# Patient Record
Sex: FEMALE | URBAN - METROPOLITAN AREA
[De-identification: names, ages, dates, MRNs, and addresses within clinical notes are randomized per-mention and may not be internally consistent; named-entity substitution may affect disease eponyms.]

---

## 2023-11-28 ENCOUNTER — APPOINTMENT (RX ONLY)
Dept: URBAN - METROPOLITAN AREA CLINIC 149 | Facility: CLINIC | Age: 48
Setting detail: DERMATOLOGY
End: 2023-11-28

## 2023-11-28 DIAGNOSIS — Z71.89 OTHER SPECIFIED COUNSELING: ICD-10-CM

## 2023-11-28 DIAGNOSIS — L57.8 OTHER SKIN CHANGES DUE TO CHRONIC EXPOSURE TO NONIONIZING RADIATION: ICD-10-CM

## 2023-11-28 DIAGNOSIS — D22 MELANOCYTIC NEVI: ICD-10-CM

## 2023-11-28 DIAGNOSIS — L82.1 OTHER SEBORRHEIC KERATOSIS: ICD-10-CM

## 2023-11-28 DIAGNOSIS — D18.0 HEMANGIOMA: ICD-10-CM

## 2023-11-28 DIAGNOSIS — L21.8 OTHER SEBORRHEIC DERMATITIS: ICD-10-CM

## 2023-11-28 DIAGNOSIS — Z85.828 PERSONAL HISTORY OF OTHER MALIGNANT NEOPLASM OF SKIN: ICD-10-CM

## 2023-11-28 PROBLEM — D23.5 OTHER BENIGN NEOPLASM OF SKIN OF TRUNK: Status: ACTIVE | Noted: 2023-11-28

## 2023-11-28 PROBLEM — D18.01 HEMANGIOMA OF SKIN AND SUBCUTANEOUS TISSUE: Status: ACTIVE | Noted: 2023-11-28

## 2023-11-28 PROBLEM — D22.5 MELANOCYTIC NEVI OF TRUNK: Status: ACTIVE | Noted: 2023-11-28

## 2023-11-28 PROCEDURE — ? EDUCATIONAL RESOURCES PROVIDED

## 2023-11-28 PROCEDURE — ? TREATMENT REGIMEN

## 2023-11-28 PROCEDURE — ? PRESCRIPTION

## 2023-11-28 PROCEDURE — ? PATIENT SPECIFIC COUNSELING

## 2023-11-28 PROCEDURE — ? COUNSELING

## 2023-11-28 PROCEDURE — ? OTC TREATMENT REGIMEN

## 2023-11-28 PROCEDURE — 99203 OFFICE O/P NEW LOW 30 MIN: CPT

## 2023-11-28 PROCEDURE — ? PRESCRIPTION MEDICATION MANAGEMENT

## 2023-11-28 RX ORDER — HYDROCORTISONE 25 MG/G
CREAM TOPICAL
Qty: 30 | Refills: 2 | Status: ERX | COMMUNITY
Start: 2023-11-28

## 2023-11-28 RX ORDER — KETOCONAZOLE 20 MG/G
CREAM TOPICAL
Qty: 60 | Refills: 2 | Status: ERX | COMMUNITY
Start: 2023-11-28

## 2023-11-28 RX ADMIN — KETOCONAZOLE: 20 CREAM TOPICAL at 00:00

## 2023-11-28 RX ADMIN — HYDROCORTISONE: 25 CREAM TOPICAL at 00:00

## 2023-11-28 ASSESSMENT — LOCATION DETAILED DESCRIPTION DERM
LOCATION DETAILED: RIGHT CYMBA CONCHA
LOCATION DETAILED: LEFT ANTERIOR PROXIMAL THIGH
LOCATION DETAILED: LEFT CRUS OF HELIX
LOCATION DETAILED: RIGHT SUPERIOR UPPER BACK

## 2023-11-28 ASSESSMENT — LOCATION ZONE DERM
LOCATION ZONE: EAR
LOCATION ZONE: LEG
LOCATION ZONE: TRUNK

## 2023-11-28 ASSESSMENT — LOCATION SIMPLE DESCRIPTION DERM
LOCATION SIMPLE: RIGHT EAR
LOCATION SIMPLE: LEFT EAR
LOCATION SIMPLE: RIGHT UPPER BACK
LOCATION SIMPLE: LEFT THIGH

## 2023-11-28 NOTE — PROCEDURE: OTC TREATMENT REGIMEN
Detail Level: Zone
Patient Specific Otc Recommendations (Will Not Stick From Patient To Patient): Follow instructions on information sheet\\nSeborrheic Keratoses (SK)(Age Spots) Treatment\\nThis explains how to use Lactic and Salicylic acid to treat SKs

## 2023-11-28 NOTE — PROCEDURE: PRESCRIPTION MEDICATION MANAGEMENT
Detail Level: Generalized
Initiate Treatment: Keto cream BID\\nHC 2.5 BID
Render In Strict Bullet Format?: No
Initiate Treatment: SK OTC handout given in office today.

## 2023-11-28 NOTE — PROCEDURE: COUNSELING
Detail Level: Simple
Detail Level: Zone
Detail Level: Generalized
Patient Specific Counseling (Will Not Stick From Patient To Patient): Information Sheets given to the patient are noted here or elsewhere in this note;